# Patient Record
Sex: FEMALE | Race: WHITE | NOT HISPANIC OR LATINO | Employment: FULL TIME | URBAN - METROPOLITAN AREA
[De-identification: names, ages, dates, MRNs, and addresses within clinical notes are randomized per-mention and may not be internally consistent; named-entity substitution may affect disease eponyms.]

---

## 2017-07-18 ENCOUNTER — INITIAL CONSULT (OUTPATIENT)
Dept: PSYCHIATRY | Facility: CLINIC | Age: 62
End: 2017-07-18
Payer: COMMERCIAL

## 2017-07-18 DIAGNOSIS — F33.1 MODERATE EPISODE OF RECURRENT MAJOR DEPRESSIVE DISORDER: Primary | ICD-10-CM

## 2017-07-18 DIAGNOSIS — G47.00 INSOMNIA, UNSPECIFIED TYPE: ICD-10-CM

## 2017-07-18 PROCEDURE — 90792 PSYCH DIAG EVAL W/MED SRVCS: CPT | Mod: S$GLB,,, | Performed by: PSYCHIATRY & NEUROLOGY

## 2017-07-18 RX ORDER — BACLOFEN 10 MG/1
TABLET ORAL
COMMUNITY
Start: 2017-07-11

## 2017-07-18 RX ORDER — TRAZODONE HYDROCHLORIDE 50 MG/1
TABLET ORAL
Qty: 30 TABLET | Refills: 2 | Status: SHIPPED | OUTPATIENT
Start: 2017-07-18 | End: 2017-09-18 | Stop reason: SDUPTHER

## 2017-07-18 RX ORDER — FUROSEMIDE 20 MG/1
20 TABLET ORAL
COMMUNITY

## 2017-07-18 RX ORDER — ESTRADIOL 1 MG/1
TABLET ORAL
COMMUNITY
Start: 2017-04-21

## 2017-07-18 RX ORDER — RANOLAZINE 500 MG/1
500 TABLET, EXTENDED RELEASE ORAL
COMMUNITY

## 2017-07-18 RX ORDER — OMEPRAZOLE 20 MG/1
20 CAPSULE, DELAYED RELEASE ORAL
COMMUNITY
Start: 2017-07-05

## 2017-07-18 RX ORDER — ROSUVASTATIN CALCIUM 40 MG/1
40 TABLET, COATED ORAL DAILY
Refills: 4 | COMMUNITY
Start: 2017-05-18

## 2017-07-18 RX ORDER — SERTRALINE HYDROCHLORIDE 100 MG/1
100 TABLET, FILM COATED ORAL
COMMUNITY
Start: 2017-06-06 | End: 2017-07-18 | Stop reason: SDUPTHER

## 2017-07-18 RX ORDER — CLOPIDOGREL BISULFATE 75 MG/1
75 TABLET ORAL
COMMUNITY

## 2017-07-18 RX ORDER — ATENOLOL 25 MG/1
25 TABLET ORAL
COMMUNITY

## 2017-07-18 RX ORDER — SERTRALINE HYDROCHLORIDE 100 MG/1
TABLET, FILM COATED ORAL
Qty: 45 TABLET | Refills: 2 | Status: SHIPPED | OUTPATIENT
Start: 2017-07-18 | End: 2017-09-18 | Stop reason: SDUPTHER

## 2017-07-18 NOTE — PROGRESS NOTES
"Outpatient Psychiatry Initial Visit (MD/NP)    2017    Evelia Nogueira, a 62 y.o. female, presenting for initial evaluation visit. Met with patient.    Reason for Encounter: self-referral. Patient complains of depression    History of Present Illness: 63 y/o WF with hx of depression, currently under care of her PCP, Dr. Flores, presents for psych assessment, s/p psych inpatient hospitalization for depression following suicide attempt. Describes her family role as "rock of the family", but that life stressors including oldest dtr addicted to meth, estranged from family and source of conflict between patient and her other children, have rendered her unable to function fully in this role ("The rock broke"). Hasn't seen this for 2 years; occasionally gets "ugly messages"; 3 grandchildren from this daughter placed with family. Depressed and down about situation; "didn't want to be here anymore"; went home from  found me; called 911. Seen at [facility] in [place] --> [place] [facility]. Inpatient for 7 days. Feeling somewhat better since discharge, getting counseling from [employer] [facility]. attending weekly. Helpful. Addressing unresolved issues - feeling didn't meet dad's standards because she didn't want to go to college & didn't like her choice of . Still feels anxious at 5-6 PM. Nervous, shaky. Chest hurts and almost have a panic attack when I even think of going back to work. Since out of hospital, family is more sympathetic to her concerns, "listens to me talk". Fears losing control of self in future, however, doesn't like to be alone for fear of desire of wanting to harm herself returning. Ongoing sad moods, anergia, anhedonia, insomnia.     MedHx: CAD and chronic angina , stent in LAD in ' for 80% blockage; HLD and TGL;  PsychHx: problematic last 4-5 years; prozac in the past year. Now taking sertraline. VH of   once 2 months after 's death. Hallucinations in hours " post-overdose. Sees Nancy Dillard with [employer]   FamHx: uncle and aunt on mom's side committed. Mom attempted at one point. Heavy depression on mother's  SocialHx:  Born in [place], grew up in [place] with both parents in the home. Bruised easily and had anemia intermittently. Perfect 4.0, perfect attendance. No studying. Wanted to be a . Did it for a while. Hard-headed and stubborn. 3 kids from first marriage. 1 from last marriage.  HS education + cosmetology. 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 Physically straining (Stocking, lifing). Hasn't been back to work.  has prostate CA and Crohn's dz. First   in .  x 2. Series of losses. 4 kids. Good relationship with the younger.   Substance: Very rare alcohol, no illicits, no prescription misuse.     Review Of Systems:     GENERAL:  No weight gain or loss  SKIN:  No rashes or lacerations  HEAD:  No headaches  EYES:  No exophthalmos, jaundice or blindness  EARS:  No dizziness, tinnitus or hearing loss  NOSE:  No changes in smell  MOUTH & THROAT:  No dyskinetic movements or obvious goiter  CHEST:  No shortness of breath, hyperventilation or cough  CARDIOVASCULAR:  No tachycardia or chest pain  ABDOMEN:  No nausea, vomiting, pain, constipation or diarrhea  URINARY:  No frequency, dysuria or sexual dysfunction  ENDOCRINE:  No polydipsia, polyuria  MUSCULOSKELETAL:  No pain or stiffness of the joints  NEUROLOGIC:  No weakness, sensory changes, seizures, confusion, memory loss, tremor or other abnormal movements      Current Evaluation:     Nutritional Screening: Considering the patient's height and weight, medications, medical history and preferences, should a referral be made to the dietitian? no    Constitutional  Vitals:  Most recent vital signs, dated less than 90 days prior to this appointment, were not reviewed.  There were no vitals filed for this visit.     General:  unremarkable, age appropriate      Musculoskeletal  Muscle Strength/Tone:  no tremor, no tic   Gait & Station:  non-ataxic     Psychiatric  Appearance: casually dressed & groomed;   Behavior: calm,   Cooperation: cooperative with assessment  Speech: normal rate, volume, tone  Thought Process: linear, goal-directed  Thought Content: No suicidal or homicidal ideation; no delusions  Affect: depressed  Mood: depressed  Perceptions: No auditory or visual hallucinations  Level of Consciousness: alert throughout interview  Insight: fair  Cognition: Oriented to person, place, time, & situation  Memory: no apparent deficits to general clinical interview; not formally assessed  Attention/Concentration: no apparent deficits to general clinical interview; not formally assessed  Fund of Knowledge: average by vocabulary/education    Laboratory Data  No visits with results within 1 Month(s) from this visit.   Latest known visit with results is:   No results found for any previous visit.     Medications  No outpatient encounter prescriptions on file as of 7/18/2017.     No facility-administered encounter medications on file as of 7/18/2017.      Assessment - Diagnosis - Goals:     Impression: This 61 y/o WF with MDD previously managed through primary care now establishing care post impulsive suicidal overdose. Adherent to medication which she finds partially helpful, participating in outpatient psychotherapy.     MDD, recurrent, moderate  Treatment Goals:  Specify outcomes written in observable, behavioral terms:   Decrease depression by qids    Treatment Plan/Recommendations:   · Medication Management: The risks and benefits of medication were discussed with the patient. sertraline + trazodone prn.  Increase sertraline to 150 mg daily.   · Discussed risks, benefits, and alternatives to treatment plan documented above with patient. I answered all patient questions related to this plan and patient expressed understanding and agreement.     Return to Clinic: 2  months    Counseling time: 10 minutes   Total time: 50 minutes     CARMEN Banks MD

## 2017-09-11 RX ORDER — SERTRALINE HYDROCHLORIDE 100 MG/1
TABLET, FILM COATED ORAL
Qty: 45 TABLET | OUTPATIENT
Start: 2017-09-11

## 2017-09-18 ENCOUNTER — OFFICE VISIT (OUTPATIENT)
Dept: PSYCHIATRY | Facility: CLINIC | Age: 62
End: 2017-09-18
Payer: COMMERCIAL

## 2017-09-18 DIAGNOSIS — F33.1 MDD (MAJOR DEPRESSIVE DISORDER), RECURRENT EPISODE, MODERATE: Primary | ICD-10-CM

## 2017-09-18 DIAGNOSIS — F41.9 ANXIETY: ICD-10-CM

## 2017-09-18 DIAGNOSIS — G47.00 INSOMNIA, UNSPECIFIED TYPE: ICD-10-CM

## 2017-09-18 PROCEDURE — 99213 OFFICE O/P EST LOW 20 MIN: CPT | Mod: S$GLB,,, | Performed by: PSYCHIATRY & NEUROLOGY

## 2017-09-18 RX ORDER — TRAZODONE HYDROCHLORIDE 50 MG/1
TABLET ORAL
Qty: 30 TABLET | Refills: 1 | Status: SHIPPED | OUTPATIENT
Start: 2017-09-18 | End: 2017-11-06 | Stop reason: SDUPTHER

## 2017-09-18 RX ORDER — CLONAZEPAM 0.25 MG/1
0.25 TABLET, ORALLY DISINTEGRATING ORAL DAILY PRN
Qty: 30 TABLET | Refills: 1 | Status: SHIPPED | OUTPATIENT
Start: 2017-09-18 | End: 2018-11-10 | Stop reason: ALTCHOICE

## 2017-09-18 RX ORDER — SERTRALINE HYDROCHLORIDE 100 MG/1
TABLET, FILM COATED ORAL
Qty: 45 TABLET | Refills: 1 | Status: SHIPPED | OUTPATIENT
Start: 2017-09-18 | End: 2017-09-18 | Stop reason: SDUPTHER

## 2017-09-18 RX ORDER — SERTRALINE HYDROCHLORIDE 100 MG/1
200 TABLET, FILM COATED ORAL DAILY
Qty: 60 TABLET | Refills: 1 | Status: SHIPPED | OUTPATIENT
Start: 2017-09-18 | End: 2017-11-09 | Stop reason: SDUPTHER

## 2017-09-18 NOTE — PROGRESS NOTES
"Outpatient Psychiatry Follow-up Visit (MD/NP)    9/18/2017    Evelia Nogueira, a 62 y.o. female, presenting for follow-up visit. Met with patient.    Reason for Encounter: self-referral. Patient complains of depression    Interval History: Patient seen & interviewed for f/u, about 2 months since initial visit. Describes mild to moderate improvement in depression and anhedonia, working on coping skills in therapy. Experiencing Early AM awakening, inability/difficulty returning to sleep, anxious toward end of day, feels walls are closing in. Reports health overall ok, recently nl labs. Gaining weight, otherwise no new health problems. Increased appetite and stress eating. Completed a move (less stress).     Background: 61 y/o WF with hx of depression, currently under care of her PCP, Dr. Flores, presents for psych assessment, s/p psych inpatient hospitalization for depression following suicide attempt. Describes her family role as "rock of the family", but that life stressors including oldest dtr addicted to meth, estranged from family and source of conflict between patient & her other children, have rendered her unable to function fully in this role ("The rock broke"). Hasn't seen this for 2 years; occasionally gets "ugly messages"; 3 grandchildren from this daughter placed with family. Depressed & down about situation; "didn't want to be here anymore"; went home from  found me; called 911. Seen at [facility] in [place] --> [place] [facility]. Inpatient for 7 days. Feeling somewhat better since discharge, getting counseling from [employer] [facility]. attending weekly. Helpful. Addressing unresolved issues - feeling didn't meet dad's standards because she didn't want to go to college & didn't like her choice of . Still feels anxious at 5-6 PM. Nervous, shaky. Chest hurts and almost have a panic attack when I even think of going back to work. Since out of hospital, family is more sympathetic to her " "concerns, "listens to me talk". Fears losing control of self in future, however, doesn't like to be alone for fear of desire of wanting to harm herself returning. Ongoing sad moods, anergia, anhedonia, insomnia. MedHx: CAD and chronic angina , stent in LAD in  for 80% blockage; HLD and TGL; PsychHx: problematic last 4-5 years; prozac in the past year. Now taking sertraline. VH of   once 2 months after 's death. Hallucinations in hours post-overdose. Sees Nancy Dillard with [employer]. FamHx: uncle & aunt on mom's side committed. Mom attempted at one point. Heavy depression on mother's. SocialHx: Born in [place], grew up in [place] with both parents in the home. Bruised easily & had anemia intermittently. Perfect 4.0, perfect attendance. No studying. Wanted to be a . Did it for a while. Hard-headed & stubborn. 3 kids from first marriage. 1 from last marriage. HS education + cosmetology. 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 Physically straining (Stocking, lifing). Hasn't been back to work.  has prostate CA and Crohn's dz. First   in .  x 2. Series of losses. 4 kids. Good relationship with the younger. SubstanceHx: Very rare alcohol, no illicits, no prescription misuse.     Review Of Systems:     GENERAL:  No weight gain or loss  SKIN:  No rashes or lacerations  HEAD:  No headaches  EYES:  No exophthalmos, jaundice or blindness  EARS:  No dizziness, tinnitus or hearing loss  NOSE:  No changes in smell  MOUTH & THROAT:  No dyskinetic movements or obvious goiter  CHEST:  No shortness of breath, hyperventilation or cough  CARDIOVASCULAR:  No tachycardia or chest pain  ABDOMEN:  No nausea, vomiting, pain, constipation or diarrhea  URINARY:  No frequency, dysuria or sexual dysfunction  ENDOCRINE:  No polydipsia, polyuria  MUSCULOSKELETAL:  No pain or stiffness of the joints  NEUROLOGIC:  No weakness, sensory changes, seizures, " confusion, memory loss, tremor or other abnormal movements    Current Evaluation:     Nutritional Screening: Considering the patient's height and weight, medications, medical history and preferences, should a referral be made to the dietitian? no    Constitutional  Vitals:  Most recent vital signs, dated less than 90 days prior to this appointment, were not reviewed.  There were no vitals filed for this visit.     General:  unremarkable, age appropriate     Musculoskeletal  Muscle Strength/Tone:  no tremor, no tic   Gait & Station:  non-ataxic     Psychiatric  Appearance: casually dressed & groomed;   Behavior: calm,   Cooperation: cooperative with assessment  Speech: normal rate, volume, tone  Thought Process: linear, goal-directed  Thought Content: No suicidal or homicidal ideation; no delusions  Affect: depressed  Mood: depressed  Perceptions: No auditory or visual hallucinations  Level of Consciousness: alert throughout interview  Insight: fair  Cognition: Oriented to person, place, time, & situation  Memory: no apparent deficits to general clinical interview; not formally assessed  Attention/Concentration: no apparent deficits to general clinical interview; not formally assessed  Fund of Knowledge: average by vocabulary/education    Laboratory Data  No visits with results within 1 Month(s) from this visit.   Latest known visit with results is:   No results found for any previous visit.     Medications  Outpatient Encounter Prescriptions as of 9/18/2017   Medication Sig Dispense Refill    atenolol (TENORMIN) 25 MG tablet Take 25 mg by mouth.      baclofen (LIORESAL) 10 MG tablet       clopidogrel (PLAVIX) 75 mg tablet Take 75 mg by mouth.      estradiol (ESTRACE) 1 MG tablet TAKE 1 TABLET BY MOUTH ONCE A DAY      furosemide (LASIX) 20 MG tablet Take 20 mg by mouth.      omeprazole (PRILOSEC) 20 MG capsule Take 20 mg by mouth.      ranolazine (RANEXA) 500 MG Tb12 Take 500 mg by mouth.      rosuvastatin  (CRESTOR) 40 MG Tab Take 40 mg by mouth once daily.  4    sertraline (ZOLOFT) 100 MG tablet Take 1 and 1/2 tablets daily 45 tablet 2    trazodone (DESYREL) 50 MG tablet Take 1/2 to 1 tablet at bedtime as needed for sleep 30 tablet 2     No facility-administered encounter medications on file as of 9/18/2017.      Assessment - Diagnosis - Goals:     Impression: This 63 y/o WF with MDD previously managed through primary care now establishing care post impulsive suicidal overdose. Has been adherent to medication which she finds partially helpful (more so at 150 mg daily than 100 mg), participating in outpatient psychotherapy. Experiencing ongoing apparently unprovoked anxiety spells in evenings several days/week.    MDD, recurrent, moderate  Treatment Goals: Specify outcomes written in observable, behavioral terms:   Decrease depression by qids    Treatment Plan/Recommendations:   · Medication Management: The risks and benefits of medication were discussed with the patient. sertraline + trazodone prn.  Increase sertraline to 200 mg daily. Start clonazepam daily prn.   · Discussed risks, benefits, & alternatives to treatment plan documented above with patient. I answered all patient questions related to this plan & patient expressed understanding & agreement.     Return to Clinic: 2 months    Counseling time: 5 minutes   Total time: 20 minutes     CARMEN Banks MD

## 2017-11-06 RX ORDER — TRAZODONE HYDROCHLORIDE 50 MG/1
TABLET ORAL
Qty: 30 TABLET | Refills: 0 | Status: SHIPPED | OUTPATIENT
Start: 2017-11-06 | End: 2017-11-09 | Stop reason: SDUPTHER

## 2017-11-07 NOTE — PROGRESS NOTES
"Outpatient Psychiatry Follow-up Visit (MD/NP)    11/9/2017    Evelia Nogueira, a 62 y.o. female, presenting for follow-up visit. Met with patient.    Reason for Encounter: self-referral. Patient complains of depression    Interval History: Patient seen & interviewed for follow-up, last seen about 2 months previously. Reports moods are again improved, now euthymic to mildly dysphoric most of the time. Fewer anxiety spells. Adherent with meds - uses clonazepam occasionally. Trazodone nightly. Denies side effects including with most recent medication changes. Denies any SI. Weight stable. Ongoing cough; Continuing to participate in therapy weekly.      Background: 61 y/o WF with hx of depression, currently under care of her PCP, Dr. Flores, presents for psych assessment, s/p psych inpatient hospitalization for depression following suicide attempt. Describes her family role as "rock of the family", but that life stressors including oldest dtr addicted to meth, estranged from family and source of conflict between patient & her other children, have rendered her unable to function fully in this role ("The rock broke"). Hasn't seen this for 2 years; occasionally gets "ugly messages"; 3 grandchildren from this daughter placed with family. Depressed & down about situation; "didn't want to be here anymore"; went home from  found me; called 911. Seen at [facility] in [place] --> [place] psych. Inpatient for 7 days. Feeling somewhat better since discharge, getting counseling from [employer] [facility]. attending weekly. Helpful. Addressing unresolved issues - feeling didn't meet dad's standards because she didn't want to go to college & didn't like her choice of . Still feels anxious at 5-6 PM. Nervous, shaky. Chest hurts and almost have a panic attack when I even think of going back to work. Since out of hospital, family is more sympathetic to her concerns, "listens to me talk". Fears losing control of self " in future, however, doesn't like to be alone for fear of desire of wanting to harm herself returning. Ongoing sad moods, anergia, anhedonia, insomnia. MedHx: CAD and chronic angina , stent in LAD in  for 80% blockage; HLD and TGL; PsychHx: problematic last 4-5 years; prozac in the past year. Now taking sertraline. VH of   once 2 months after 's death. Hallucinations in hours post-overdose. Sees Nancy Dillard with [employer]. FamHx: uncle & aunt on mom's side committed. Mom attempted at one point. Heavy depression on mother's. SocialHx: Born in [place], grew up in [place] with both parents in the home. Bruised easily & had anemia intermittently. Perfect 4.0, perfect attendance. No studying. Wanted to be a . Did it for a while. Hard-headed & stubborn. 3 kids from first marriage. 1 from last marriage. HS education + cosmetology. 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 Physically straining (Stocking, lifing). Hasn't been back to work.  has prostate CA and Crohn's dz. First   in .  x 2. Series of losses. 4 kids. Good relationship with the younger. SubstanceHx: Very rare alcohol, no illicits, no prescription misuse.     Review Of Systems:     GENERAL:  No weight gain or loss  SKIN:  No rashes or lacerations  HEAD:  No headaches  EYES:  No exophthalmos, jaundice or blindness  EARS:  No dizziness, tinnitus or hearing loss  NOSE:  No changes in smell  MOUTH & THROAT:  No dyskinetic movements or obvious goiter  CHEST:  No shortness of breath, hyperventilation or cough  CARDIOVASCULAR:  No tachycardia or chest pain  ABDOMEN:  No nausea, vomiting, pain, constipation or diarrhea  URINARY:  No frequency, dysuria or sexual dysfunction  ENDOCRINE:  No polydipsia, polyuria  MUSCULOSKELETAL:  No pain or stiffness of the joints  NEUROLOGIC:  No weakness, sensory changes, seizures, confusion, memory loss, tremor or other abnormal movements    Current  Evaluation:     Nutritional Screening: Considering the patient's height and weight, medications, medical history and preferences, should a referral be made to the dietitian? no    Constitutional  Vitals:  Most recent vital signs, dated less than 90 days prior to this appointment, were not reviewed.  There were no vitals filed for this visit.     General:  unremarkable, age appropriate     Musculoskeletal  Muscle Strength/Tone:  no tremor, no tic   Gait & Station:  non-ataxic     Psychiatric  Appearance: casually dressed & groomed;   Behavior: calm,   Cooperation: cooperative with assessment  Speech: normal rate, volume, tone  Thought Process: linear, goal-directed  Thought Content: No suicidal or homicidal ideation; no delusions  Affect: restricted  Mood: euthymic  Perceptions: No auditory or visual hallucinations  Level of Consciousness: alert throughout interview  Insight: fair  Cognition: Oriented to person, place, time, & situation  Memory: no apparent deficits to general clinical interview; not formally assessed  Attention/Concentration: no apparent deficits to general clinical interview; not formally assessed  Fund of Knowledge: average by vocabulary/education    Laboratory Data  No visits with results within 1 Month(s) from this visit.   Latest known visit with results is:   No results found for any previous visit.     Medications  Outpatient Encounter Prescriptions as of 11/9/2017   Medication Sig Dispense Refill    atenolol (TENORMIN) 25 MG tablet Take 25 mg by mouth.      baclofen (LIORESAL) 10 MG tablet       clonazePAM (KLONOPIN) 0.25 MG TbDL Take 1 tablet (0.25 mg total) by mouth daily as needed. 30 tablet 1    clopidogrel (PLAVIX) 75 mg tablet Take 75 mg by mouth.      estradiol (ESTRACE) 1 MG tablet TAKE 1 TABLET BY MOUTH ONCE A DAY      furosemide (LASIX) 20 MG tablet Take 20 mg by mouth.      omeprazole (PRILOSEC) 20 MG capsule Take 20 mg by mouth.      ranolazine (RANEXA) 500 MG Tb12 Take  500 mg by mouth.      rosuvastatin (CRESTOR) 40 MG Tab Take 40 mg by mouth once daily.  4    sertraline (ZOLOFT) 100 MG tablet Take 2 tablets (200 mg total) by mouth once daily. Take 1 and 1/2 tablets daily 60 tablet 1    traZODone (DESYREL) 50 MG tablet TABLET 1/2 to 1 tablet BY MOUTH AT BEDTIME AS NEEDED **MAY CAUSE DROWSINESS** 30 tablet 0     No facility-administered encounter medications on file as of 11/9/2017.      Assessment - Diagnosis - Goals:     Impression: This 63 y/o WF with MDD w/yhx of impulsive suicidal overdose. Has been adherent to medication which she finds partially helpful (more so at 150 mg daily than 100 mg), participating in outpatient psychotherapy. Improved symptom control since last visit.    MDD, recurrent, moderate  Treatment Goals: Specify outcomes written in observable, behavioral terms:   Decrease depression by qids    Treatment Plan/Recommendations:   · Medication Management: The risks and benefits of medication were discussed with the patient. sertraline + trazodone prn.  Continue sertraline to 200 mg daily, clonazepam daily prn.   · Discussed risks, benefits, & alternatives to treatment plan documented above with patient. I answered all patient questions related to this plan & patient expressed understanding & agreement.     Return to Clinic: 4 months    Counseling time: 5 minutes   Total time: 20 minutes     CARMEN Banks MD

## 2017-11-09 ENCOUNTER — OFFICE VISIT (OUTPATIENT)
Dept: PSYCHIATRY | Facility: CLINIC | Age: 62
End: 2017-11-09
Payer: COMMERCIAL

## 2017-11-09 DIAGNOSIS — F33.41 MDD (MAJOR DEPRESSIVE DISORDER), RECURRENT, IN PARTIAL REMISSION: Primary | ICD-10-CM

## 2017-11-09 DIAGNOSIS — F41.9 ANXIETY: ICD-10-CM

## 2017-11-09 PROCEDURE — 99213 OFFICE O/P EST LOW 20 MIN: CPT | Mod: S$GLB,,, | Performed by: PSYCHIATRY & NEUROLOGY

## 2017-11-09 RX ORDER — TRAZODONE HYDROCHLORIDE 50 MG/1
TABLET ORAL
Qty: 30 TABLET | Refills: 2 | Status: SHIPPED | OUTPATIENT
Start: 2017-11-09 | End: 2018-02-06 | Stop reason: SDUPTHER

## 2017-11-09 RX ORDER — SERTRALINE HYDROCHLORIDE 100 MG/1
200 TABLET, FILM COATED ORAL DAILY
Qty: 60 TABLET | Refills: 2 | Status: SHIPPED | OUTPATIENT
Start: 2017-11-09 | End: 2018-02-06 | Stop reason: SDUPTHER

## 2018-02-06 ENCOUNTER — OFFICE VISIT (OUTPATIENT)
Dept: PSYCHIATRY | Facility: CLINIC | Age: 63
End: 2018-02-06
Payer: COMMERCIAL

## 2018-02-06 DIAGNOSIS — F33.41 MDD (MAJOR DEPRESSIVE DISORDER), RECURRENT, IN PARTIAL REMISSION: Primary | ICD-10-CM

## 2018-02-06 PROCEDURE — 99213 OFFICE O/P EST LOW 20 MIN: CPT | Mod: S$GLB,,, | Performed by: PSYCHIATRY & NEUROLOGY

## 2018-02-06 RX ORDER — TRAZODONE HYDROCHLORIDE 50 MG/1
TABLET ORAL
Qty: 30 TABLET | Refills: 3 | Status: SHIPPED | OUTPATIENT
Start: 2018-02-06 | End: 2018-11-10 | Stop reason: ALTCHOICE

## 2018-02-06 RX ORDER — SERTRALINE HYDROCHLORIDE 100 MG/1
200 TABLET, FILM COATED ORAL DAILY
Qty: 60 TABLET | Refills: 3 | Status: SHIPPED | OUTPATIENT
Start: 2018-02-06 | End: 2018-11-10 | Stop reason: ALTCHOICE

## 2018-02-06 NOTE — PROGRESS NOTES
"Outpatient Psychiatry Follow-up Visit (MD/NP)    2/6/2018    Evelia Nogueira, a 62 y.o. female, presenting for follow-up visit. Met with patient.    Reason for Encounter: self-referral. Patient complains of depression    Interval History: Patient seen & interviewed for follow-up, last seen about 3 months previously. Reports moods have been "good", that she continues to participate in psychotherapy though is weaning and anticipates finishing within several months. Attributes wellness to medication and therapy. "Feel very much in control". Psych meds - adherent with sertraline (well tolerated), not clonazepam. Trazodone - taking nightly. No SI. Ongoing problems with sinus infection, no other health problems.     Background: 63 y/o WF with hx of depression, currently under care of her PCP, Dr. Flores, presents for psych assessment, s/p psych inpatient hospitalization for depression following suicide attempt. Describes her family role as "rock of the family", but that life stressors including oldest dtr addicted to meth, estranged from family and source of conflict between patient & her other children, have rendered her unable to function fully in this role ("The rock broke"). Hasn't seen this for 2 years; occasionally gets "ugly messages"; 3 grandchildren from this daughter placed with family. Depressed & down about situation; "didn't want to be here anymore"; went home from  found me; called 911. Seen at [facility] in [place] --> [place] psych. Inpatient for 7 days. Feeling somewhat better since discharge, getting [facility] from [employer]. attending weekly. Helpful. Addressing unresolved issues - feeling didn't meet dad's standards because she didn't want to go to college & didn't like her choice of . Still feels anxious at 5-6 PM. Nervous, shaky. Chest hurts and almost have a panic attack when I even think of going back to work. Since out of hospital, family is more sympathetic to her " "concerns, "listens to me talk". Fears losing control of self in future, however, doesn't like to be alone for fear of desire of wanting to harm herself returning. Ongoing sad moods, anergia, anhedonia, insomnia. MedHx: CAD and chronic angina , stent in LAD in  for 80% blockage; HLD and TGL; PsychHx: problematic last 4-5 years; prozac in the past year. Now taking sertraline. VH of   once 2 months after 's death. Hallucinations in hours post-overdose. Sees Nancy Dillard with [facility]. FamHx: uncle & aunt on mom's side committed. Mom attempted at one point. Heavy depression on mother's. SocialHx: Born in [place], grew up in [place] with both parents in the home. Bruised easily & had anemia intermittently. Perfect 4.0, perfect attendance. No studying. Wanted to be a . Did it for a while. Hard-headed & stubborn. 3 kids from first marriage. 1 from last marriage. HS education + cosmetology. 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 Physically straining (Stocking, lifing). Hasn't been back to work.  has prostate CA and Crohn's dz. First   in .  x 2. Series of losses. 4 kids. Good relationship with the younger. SubstanceHx: Very rare alcohol, no illicits, no prescription misuse.     Review Of Systems:     GENERAL:  No weight gain or loss  SKIN:  No rashes or lacerations  HEAD:  No headaches  EYES:  No exophthalmos, jaundice or blindness  EARS:  No dizziness, tinnitus or hearing loss  NOSE:  No changes in smell  MOUTH & THROAT:  No dyskinetic movements or obvious goiter  CHEST:  No shortness of breath, hyperventilation or cough  CARDIOVASCULAR:  No tachycardia or chest pain  ABDOMEN:  No nausea, vomiting, pain, constipation or diarrhea  URINARY:  No frequency, dysuria or sexual dysfunction  ENDOCRINE:  No polydipsia, polyuria  MUSCULOSKELETAL:  No pain or stiffness of the joints  NEUROLOGIC:  No weakness, sensory changes, seizures, " confusion, memory loss, tremor or other abnormal movements    Current Evaluation:     Nutritional Screening: Considering the patient's height and weight, medications, medical history and preferences, should a referral be made to the dietitian? no    Constitutional  Vitals:  Most recent vital signs, dated less than 90 days prior to this appointment, were not reviewed.  There were no vitals filed for this visit.     General:  unremarkable, age appropriate     Musculoskeletal  Muscle Strength/Tone:  no tremor, no tic   Gait & Station:  non-ataxic     Psychiatric  Appearance: casually dressed & groomed;   Behavior: calm,   Cooperation: cooperative with assessment  Speech: normal rate, volume, tone  Thought Process: linear, goal-directed  Thought Content: No suicidal or homicidal ideation; no delusions  Affect: restricted  Mood: euthymic  Perceptions: No auditory or visual hallucinations  Level of Consciousness: alert throughout interview  Insight: fair  Cognition: Oriented to person, place, time, & situation  Memory: no apparent deficits to general clinical interview; not formally assessed  Attention/Concentration: no apparent deficits to general clinical interview; not formally assessed  Fund of Knowledge: average by vocabulary/education    Laboratory Data  No visits with results within 1 Month(s) from this visit.   Latest known visit with results is:   No results found for any previous visit.     Medications  Outpatient Encounter Prescriptions as of 2/6/2018   Medication Sig Dispense Refill    atenolol (TENORMIN) 25 MG tablet Take 25 mg by mouth.      baclofen (LIORESAL) 10 MG tablet       clonazePAM (KLONOPIN) 0.25 MG TbDL Take 1 tablet (0.25 mg total) by mouth daily as needed. 30 tablet 1    clopidogrel (PLAVIX) 75 mg tablet Take 75 mg by mouth.      estradiol (ESTRACE) 1 MG tablet TAKE 1 TABLET BY MOUTH ONCE A DAY      furosemide (LASIX) 20 MG tablet Take 20 mg by mouth.      omeprazole (PRILOSEC) 20 MG  capsule Take 20 mg by mouth.      ranolazine (RANEXA) 500 MG Tb12 Take 500 mg by mouth.      rosuvastatin (CRESTOR) 40 MG Tab Take 40 mg by mouth once daily.  4    sertraline (ZOLOFT) 100 MG tablet Take 2 tablets (200 mg total) by mouth once daily. Take 1 and 1/2 tablets daily 60 tablet 2    traZODone (DESYREL) 50 MG tablet TABLET 1/2 to 1 tablet BY MOUTH AT BEDTIME AS NEEDED **MAY CAUSE DROWSINESS** 30 tablet 2     No facility-administered encounter medications on file as of 2/6/2018.      Assessment - Diagnosis - Goals:     Impression: This 61 y/o WF with MDD w/yhx of impulsive suicidal overdose. Has been adherent to medication which she finds partially helpful (more so at 150 mg daily than 100 mg), participating in outpatient psychotherapy. Generally euthymic.     MDD, recurrent, in remission    Treatment Goals: Specify outcomes written in observable, behavioral terms:   Maintain euthymia    Treatment Plan/Recommendations:   · Medication Management: The risks and benefits of medication were discussed with the patient. sertraline + trazodone prn.  Continue sertraline to 200 mg daily, clonazepam daily prn (not currently needing), trazodone prn.   · Discussed risks, benefits, & alternatives to treatment plan documented above with patient. I answered all patient questions related to this plan & patient expressed understanding & agreement.     Return to Clinic: 4 months    Counseling time: 5 minutes   Total time: 20 minutes     CARMEN Banks MD

## 2018-03-14 RX ORDER — TRAZODONE HYDROCHLORIDE 50 MG/1
TABLET ORAL
Qty: 30 TABLET | OUTPATIENT
Start: 2018-03-14

## 2018-06-06 ENCOUNTER — OFFICE VISIT (OUTPATIENT)
Dept: PSYCHIATRY | Facility: CLINIC | Age: 63
End: 2018-06-06
Payer: OTHER GOVERNMENT

## 2018-06-06 VITALS — SYSTOLIC BLOOD PRESSURE: 130 MMHG | WEIGHT: 205.13 LBS | DIASTOLIC BLOOD PRESSURE: 79 MMHG

## 2018-06-06 DIAGNOSIS — F33.40 MDD (RECURRENT MAJOR DEPRESSIVE DISORDER) IN REMISSION: Primary | ICD-10-CM

## 2018-06-06 PROCEDURE — 99213 OFFICE O/P EST LOW 20 MIN: CPT | Mod: S$PBB,,, | Performed by: PSYCHIATRY & NEUROLOGY

## 2018-06-06 NOTE — PROGRESS NOTES
"Outpatient Psychiatry Follow-up Visit (MD/NP)    6/6/2018    Evelia Nogueira, a 63 y.o. female, presenting for follow-up visit. Met with patient.    Reason for Encounter: self-referral. Patient complains of depression    Interval History: Patient seen & interviewed for follow-up, last seen about 3 months previously. No meds in the past 6 weeks due to weight gain she attributes to medication. Feeling good. Sleeping well. Still going to counseling though has been weaning that too recently. Says "I learned to deal with things" by means of therapy. Maintaining boundaries. Has reframed previous losses like work (now sees as "blessing in disguise"). Feels "back in control of things". Has even had family deaths that she feels she's handled very well since her hospitalization. Healing from carpel tunnel surgery. Has good family support.     Background: 63 y/o WF with hx of depression, currently under care of her PCP, Dr. Flores, presents for psych assessment, s/p psych inpatient hospitalization for depression following suicide attempt. Describes her family role as "rock of the family", but that life stressors including oldest dtr addicted to meth, estranged from family & source of conflict between patient & her other children, have rendered her unable to function fully in this role ("The rock broke"). Hasn't seen this for 2 years; occasionally gets "ugly messages"; 3 grandchildren from this daughter placed with family. Depressed & down about situation; "didn't want to be here anymore"; went home from  found me; called 911. Seen at [facility] in [place] --> [place] psych. Inpatient for 7 days. Feeling somewhat better since discharge, getting counseling from [facility]. attending weekly. Helpful. Addressing unresolved issues - feeling didn't meet dad's standards because she didn't want to go to college & didn't like her choice of . Still feels anxious at 5-6 PM. Nervous, shaky. Chest hurts & " "almost have a panic attack when I even think of going back to work. Since out of hospital, family is more sympathetic to her concerns, "listens to me talk". Fears losing control of self in future, however, doesn't like to be alone for fear of desire of wanting to harm herself returning. Ongoing sad moods, anergia, anhedonia, insomnia. MedHx: CAD & chronic angina, stent in LAD in  for 80% blockage; HLD & TGL; PsychHx: problematic last 4-5 years; prozac in the past year. Now taking sertraline. VH of   once 2 months after 's death. Hallucinations in hours post-overdose. Sees Nancy Dillard with [facility]. FamHx: uncle & aunt on mom's side committed. Mom attempted at one point. Heavy depression on mother's. SocialHx: Born in [place], grew up in [place] with both parents in the home. Bruised easily & had anemia intermittently. Perfect 4.0, perfect attendance. No studying. Wanted to be a . Did it for a while. Hard-headed & stubborn. 3 kids from first marriage. 1 from last marriage. HS education + cosmetology. 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 Physically straining (Stocking, lifing). Hasn't been back to work.  has prostate CA & Crohn's dz. First   in .  x 2. Series of losses. 4 kids. Good relationship with the younger. SubstanceHx: Very rare alcohol, no illicits, no prescription misuse.     Review Of Systems:     GENERAL:  No weight gain or loss  SKIN:  No rashes or lacerations  HEAD:  No headaches  CHEST:  No shortness of breath, hyperventilation or cough  CARDIOVASCULAR:  No tachycardia or chest pain  ABDOMEN:  No nausea, vomiting, pain, constipation or diarrhea  URINARY:  No frequency, dysuria or sexual dysfunction  ENDOCRINE:  No polydipsia, polyuria  MUSCULOSKELETAL:  No pain or stiffness of the joints  NEUROLOGIC:  No weakness, sensory changes, seizures, confusion, memory loss, tremor or other abnormal movements    Current " Evaluation:     Nutritional Screening: Considering the patient's height and weight, medications, medical history and preferences, should a referral be made to the dietitian? no    Constitutional  Vitals:  Most recent vital signs, dated less than 90 days prior to this appointment, were not reviewed.  There were no vitals filed for this visit.     General:  unremarkable, age appropriate     Musculoskeletal  Muscle Strength/Tone:  no tremor, no tic   Gait & Station:  non-ataxic     Psychiatric  Appearance: casually dressed & groomed;   Behavior: calm,   Cooperation: cooperative with assessment  Speech: normal rate, volume, tone  Thought Process: linear, goal-directed  Thought Content: No suicidal or homicidal ideation; no delusions  Affect: full range  Mood: euthymic  Perceptions: No auditory or visual hallucinations  Level of Consciousness: alert throughout interview  Insight: fair  Cognition: Oriented to person, place, time, & situation  Memory: no apparent deficits to general clinical interview; not formally assessed  Attention/Concentration: no apparent deficits to general clinical interview; not formally assessed  Fund of Knowledge: average by vocabulary/education    Laboratory Data  No visits with results within 1 Month(s) from this visit.   Latest known visit with results is:   No results found for any previous visit.     Medications  Outpatient Encounter Prescriptions as of 6/6/2018   Medication Sig Dispense Refill    atenolol (TENORMIN) 25 MG tablet Take 25 mg by mouth.      baclofen (LIORESAL) 10 MG tablet       clonazePAM (KLONOPIN) 0.25 MG TbDL Take 1 tablet (0.25 mg total) by mouth daily as needed. 30 tablet 1    clopidogrel (PLAVIX) 75 mg tablet Take 75 mg by mouth.      estradiol (ESTRACE) 1 MG tablet TAKE 1 TABLET BY MOUTH ONCE A DAY      furosemide (LASIX) 20 MG tablet Take 20 mg by mouth.      omeprazole (PRILOSEC) 20 MG capsule Take 20 mg by mouth.      ranolazine (RANEXA) 500 MG Tb12 Take  500 mg by mouth.      rosuvastatin (CRESTOR) 40 MG Tab Take 40 mg by mouth once daily.  4    sertraline (ZOLOFT) 100 MG tablet Take 2 tablets (200 mg total) by mouth once daily. 60 tablet 3    traZODone (DESYREL) 50 MG tablet TABLET 1/2 to 1 tablet BY MOUTH AT BEDTIME AS NEEDED **MAY CAUSE DROWSINESS** 30 tablet 3     No facility-administered encounter medications on file as of 6/6/2018.      Assessment - Diagnosis - Goals:     Impression: This 61 y/o WF with MDD w/yhx of impulsive suicidal overdose. Has been adherent to medication which she finds partially helpful (more so at 150 mg daily than 100 mg), participating in outpatient psychotherapy. Generally euthymic. Depression has been in remission ~6 months.     MDD, recurrent, in remission    Treatment Goals: Specify outcomes written in observable, behavioral terms:   Maintain euthymia    Treatment Plan/Recommendations:   · Continue psychotherapy, wean as tolerated. Currently off meds. She'll request restart if depression symptoms worsen.     Return to Clinic: 4 months    Counseling time: 5 minutes   Total time: 20 minutes     CARMEN Banks MD

## 2018-10-24 RX ORDER — SERTRALINE HYDROCHLORIDE 100 MG/1
TABLET, FILM COATED ORAL
Qty: 60 TABLET | OUTPATIENT
Start: 2018-10-24

## 2018-11-01 ENCOUNTER — OFFICE VISIT (OUTPATIENT)
Dept: PSYCHIATRY | Facility: CLINIC | Age: 63
End: 2018-11-01
Payer: OTHER GOVERNMENT

## 2018-11-01 DIAGNOSIS — F33.42 MDD (MAJOR DEPRESSIVE DISORDER), RECURRENT, IN FULL REMISSION: Primary | ICD-10-CM

## 2018-11-01 PROCEDURE — 99213 OFFICE O/P EST LOW 20 MIN: CPT | Mod: S$PBB,,, | Performed by: PSYCHIATRY & NEUROLOGY

## 2018-11-01 NOTE — PROGRESS NOTES
"Outpatient Psychiatry Follow-up Visit (MD/NP)    11/1/2018    Evelia Nogueira, a 63 y.o. female, presenting for follow-up visit. Met with patient.    Reason for Encounter: self-referral. Patient complains of depression    Interval History: Patient seen & interviewed for follow-up, last seen about 5 months previously. Feeling generally well. Is recovering from a cold. No other new health problems. Had elective vertical sleeve gastrectomy in September. Has subsequently lost 36 pounds. Goal is to lose about 30 pounds more. Feeling very good. "changed my relationship with food."   Less joint problems. Setting some boundaries with daughter. Has weaned psychotherapy. Ok off meds.     Background: 63 y/o WF with hx of depression, currently under care of her PCP, Dr. Flores, presents for psych assessment, s/p psych inpatient hospitalization for depression following suicide attempt. Describes her family role as "rock of the family", but that life stressors including oldest dtr addicted to meth, estranged from family & source of conflict between patient & her other children, have rendered her unable to function fully in this role ("The rock broke"). Hasn't seen this for 2 years; occasionally gets "ugly messages"; 3 grandchildren from this daughter placed with family. Depressed & down about situation; "didn't want to be here anymore"; went home from  found me; called 911. Seen at [facility] in [place] --> [place] psych. Inpatient for 7 days. Feeling somewhat better since discharge, getting counseling from [facility]. attending weekly. Helpful. Addressing unresolved issues - feeling didn't meet dad's standards because she didn't want to go to college & didn't like her choice of . Still feels anxious at 5-6 PM. Nervous, shaky. Chest hurts & almost have a panic attack when I even think of going back to work. Since out of hospital, family is more sympathetic to her concerns, "listens to me talk". Fears " losing control of self in future, however, doesn't like to be alone for fear of desire of wanting to harm herself returning. Ongoing sad moods, anergia, anhedonia, insomnia. MedHx: CAD & chronic angina, stent in LAD in  for 80% blockage; HLD & TGL; PsychHx: problematic last 4-5 years; prozac in the past year. Now taking sertraline. VH of   once 2 months after 's death. Hallucinations in hours post-overdose. Sees Nancy Dillard with [facility]. FamHx: uncle & aunt on mom's side committed. Mom attempted at one point. Heavy depression on mother's. SocialHx: Born in [place], grew up in [place] with both parents in the home. Bruised easily & had anemia intermittently. Perfect 4.0, perfect attendance. No studying. Wanted to be a . Did it for a while. Hard-headed & stubborn. 3 kids from first marriage. 1 from last marriage. HS education + cosmetology. 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 Physically straining (Stocking, lifing). Hasn't been back to work.  has prostate CA & Crohn's dz. First   in .  x 2. Series of losses. 4 kids. Good relationship with the younger. SubstanceHx: Very rare alcohol, no illicits, no prescription misuse.     Review Of Systems:     GENERAL:  No weight gain or loss  SKIN:  No rashes or lacerations  HEAD:  No headaches  CHEST:  No shortness of breath, hyperventilation or cough  CARDIOVASCULAR:  No tachycardia or chest pain  ABDOMEN:  No nausea, vomiting, pain, constipation or diarrhea  URINARY:  No frequency, dysuria or sexual dysfunction  ENDOCRINE:  No polydipsia, polyuria  MUSCULOSKELETAL:  No pain or stiffness of the joints  NEUROLOGIC:  No weakness, sensory changes, seizures, confusion, memory loss, tremor or other abnormal movements    Current Evaluation:     Nutritional Screening: Considering the patient's height and weight, medications, medical history and preferences, should a referral be made to the  dietitian? no    Constitutional  Vitals:  Most recent vital signs, dated less than 90 days prior to this appointment, were not reviewed.  There were no vitals filed for this visit.     General:  unremarkable, age appropriate     Musculoskeletal  Muscle Strength/Tone:  no tremor, no tic   Gait & Station:  non-ataxic     Psychiatric  Appearance: casually dressed & groomed;   Behavior: calm,   Cooperation: cooperative with assessment  Speech: normal rate, volume, tone  Thought Process: linear, goal-directed  Thought Content: No suicidal or homicidal ideation; no delusions  Affect: reactive  Mood: euthymic  Perceptions: No auditory or visual hallucinations  Level of Consciousness: alert throughout interview  Insight: fair  Cognition: Oriented to person, place, time, & situation  Memory: no apparent deficits to general clinical interview; not formally assessed  Attention/Concentration: no apparent deficits to general clinical interview; not formally assessed  Fund of Knowledge: average by vocabulary/education    Laboratory Data  No visits with results within 1 Month(s) from this visit.   Latest known visit with results is:   No results found for any previous visit.     Medications  Outpatient Encounter Medications as of 11/1/2018   Medication Sig Dispense Refill    atenolol (TENORMIN) 25 MG tablet Take 25 mg by mouth.      baclofen (LIORESAL) 10 MG tablet       clonazePAM (KLONOPIN) 0.25 MG TbDL Take 1 tablet (0.25 mg total) by mouth daily as needed. 30 tablet 1    clopidogrel (PLAVIX) 75 mg tablet Take 75 mg by mouth.      estradiol (ESTRACE) 1 MG tablet TAKE 1 TABLET BY MOUTH ONCE A DAY      furosemide (LASIX) 20 MG tablet Take 20 mg by mouth.      omeprazole (PRILOSEC) 20 MG capsule Take 20 mg by mouth.      ranolazine (RANEXA) 500 MG Tb12 Take 500 mg by mouth.      rosuvastatin (CRESTOR) 40 MG Tab Take 40 mg by mouth once daily.  4    sertraline (ZOLOFT) 100 MG tablet Take 2 tablets (200 mg total) by mouth  once daily. 60 tablet 3    traZODone (DESYREL) 50 MG tablet TABLET 1/2 to 1 tablet BY MOUTH AT BEDTIME AS NEEDED **MAY CAUSE DROWSINESS** 30 tablet 3     No facility-administered encounter medications on file as of 11/1/2018.      Assessment - Diagnosis - Goals:     Impression: This 63 y/o WF with MDD w/yhx of impulsive suicidal overdose. Has been adherent to medication which she finds partially helpful (more so at 150 mg daily than 100 mg), participating in outpatient psychotherapy. Generally euthymic. Depression has been in remission almost 1 year.     MDD, recurrent, in remission    Treatment Goals: Specify outcomes written in observable, behavioral terms: Maintain euthymia    Treatment Plan/Recommendations:   · Currently off meds and therapy, in remission almost 1 year. She'll request restart if depression symptoms return.     Return to Clinic: prn    Counseling time: 5 minutes   Total time: 20 minutes     CARMEN Banks MD